# Patient Record
Sex: MALE | Race: WHITE | NOT HISPANIC OR LATINO | Employment: OTHER | URBAN - NONMETROPOLITAN AREA
[De-identification: names, ages, dates, MRNs, and addresses within clinical notes are randomized per-mention and may not be internally consistent; named-entity substitution may affect disease eponyms.]

---

## 2020-02-19 ENCOUNTER — OFFICE VISIT (OUTPATIENT)
Dept: CARDIOLOGY | Facility: CLINIC | Age: 65
End: 2020-02-19

## 2020-02-19 VITALS
BODY MASS INDEX: 27.01 KG/M2 | DIASTOLIC BLOOD PRESSURE: 82 MMHG | HEIGHT: 72 IN | WEIGHT: 199.4 LBS | HEART RATE: 71 BPM | SYSTOLIC BLOOD PRESSURE: 125 MMHG | OXYGEN SATURATION: 99 %

## 2020-02-19 DIAGNOSIS — I25.10 CORONARY ARTERY DISEASE DUE TO CALCIFIED CORONARY LESION: Primary | ICD-10-CM

## 2020-02-19 DIAGNOSIS — I10 ESSENTIAL HYPERTENSION: ICD-10-CM

## 2020-02-19 DIAGNOSIS — R00.2 PALPITATIONS: ICD-10-CM

## 2020-02-19 DIAGNOSIS — I25.84 CORONARY ARTERY DISEASE DUE TO CALCIFIED CORONARY LESION: Primary | ICD-10-CM

## 2020-02-19 PROCEDURE — 93000 ELECTROCARDIOGRAM COMPLETE: CPT | Performed by: PHYSICIAN ASSISTANT

## 2020-02-19 PROCEDURE — 99204 OFFICE O/P NEW MOD 45 MIN: CPT | Performed by: PHYSICIAN ASSISTANT

## 2020-02-19 RX ORDER — CLOPIDOGREL BISULFATE 75 MG/1
1 TABLET ORAL DAILY
COMMUNITY
Start: 2020-01-06 | End: 2022-02-02

## 2020-02-19 RX ORDER — ATORVASTATIN CALCIUM 40 MG/1
1 TABLET, FILM COATED ORAL DAILY
COMMUNITY
Start: 2020-01-06

## 2020-02-19 RX ORDER — ASCORBIC ACID 500 MG
500 TABLET ORAL DAILY
COMMUNITY
End: 2022-02-02

## 2020-02-19 RX ORDER — ASPIRIN 81 MG/1
81 TABLET ORAL DAILY
COMMUNITY

## 2020-02-19 RX ORDER — TRAMADOL HYDROCHLORIDE 50 MG/1
1 TABLET ORAL 4 TIMES DAILY PRN
COMMUNITY
Start: 2019-12-30

## 2020-02-19 RX ORDER — MELATONIN
1000 DAILY
COMMUNITY
End: 2022-02-02

## 2020-02-19 RX ORDER — LISINOPRIL 20 MG/1
1 TABLET ORAL DAILY
COMMUNITY
Start: 2020-01-06

## 2020-02-19 NOTE — PROGRESS NOTES
"Subjective   Amrit Brannon is a 64 y.o. male     Chief Complaint   Patient presents with   • Establish Care     Here to establish care      Problem list  1.  Coronary artery disease  1.1 cardiac catheterization approximately 5 to 10 years ago in [facility] with angioplasty x2 and stenting x1, inadequate data per patient report  2.  Hypertension  3.  Dyslipidemia  4.  Chronic tobacco use    HPI    Patient is a 64-year-old male that presents to the office to establish care.  Patient recently moved to the area in the last few months.  Patient has had documented history of coronary disease and history of stenting and angioplasty.    Patient feels remarkably well.  Patient does not describe any chest discomfort at all.  Patient has no shortness of breath.  No PND orthopnea.  Occasionally he will feel a \"fluttering\" sensation.  This occurs at random.  No dizziness presyncope or syncope.  Otherwise doing well    Patient describes in the past, patient had shoulder surgery.  After the surgery, he started developing arm discomfort.  Apparently he was diagnosed with a myocardial infarction which by description seems more related to an NSTEMI.  He had catheterization with results as above per his report.  He has done well since that time      Current Outpatient Medications   Medication Sig Dispense Refill   • aspirin 81 MG EC tablet Take 81 mg by mouth Daily.     • atorvastatin (LIPITOR) 40 MG tablet Take 1 tablet by mouth Daily.     • cholecalciferol (VITAMIN D3) 25 MCG (1000 UT) tablet Take 1,000 Units by mouth Daily.     • clopidogrel (PLAVIX) 75 MG tablet Take 1 tablet by mouth Daily.     • lisinopril (PRINIVIL,ZESTRIL) 20 MG tablet Take 1 tablet by mouth Daily.     • traMADol (ULTRAM) 50 MG tablet Take 1 tablet by mouth 4 (Four) Times a Day As Needed.     • vitamin C (ASCORBIC ACID) 500 MG tablet Take 500 mg by mouth Daily.       No current facility-administered medications for this visit.        Penicillins    Past " Medical History:   Diagnosis Date   • Coronary artery disease    • Hyperlipidemia    • Hypertension    • Myocardial infarction (CMS/HCC)        Social History     Socioeconomic History   • Marital status:      Spouse name: Not on file   • Number of children: Not on file   • Years of education: Not on file   • Highest education level: Not on file   Tobacco Use   • Smoking status: Current Every Day Smoker     Packs/day: 1.00     Years: 47.00     Pack years: 47.00     Types: Cigarettes   • Smokeless tobacco: Never Used   Substance and Sexual Activity   • Alcohol use: Yes     Frequency: 4 or more times a week     Drinks per session: 1 or 2   • Drug use: Never   • Sexual activity: Defer       Family History   Problem Relation Age of Onset   • Diabetes Mother    • Heart disease Mother    • No Known Problems Father        Review of Systems   Constitutional: Negative.  Negative for chills, fatigue and fever.   HENT: Negative.    Eyes: Positive for visual disturbance (reading glasses ).   Respiratory: Negative.  Negative for chest tightness and shortness of breath.    Cardiovascular: Positive for palpitations (rare flutters ). Negative for chest pain and leg swelling.   Gastrointestinal: Negative.  Negative for abdominal pain, blood in stool, nausea and vomiting.   Endocrine: Negative.  Negative for cold intolerance and heat intolerance.   Genitourinary: Negative.  Negative for dysuria, frequency, hematuria and urgency.   Musculoskeletal: Positive for arthralgias (joints ) and back pain (low back pain ).   Skin: Negative.  Negative for rash and wound.   Allergic/Immunologic: Negative.  Negative for environmental allergies and food allergies.   Neurological: Negative.  Negative for dizziness, weakness and light-headedness.   Hematological: Bruises/bleeds easily (bruises/bleeds easily ).   Psychiatric/Behavioral: Negative.  Negative for sleep disturbance (denies waking with smothering ).   All other systems reviewed  "and are negative.      Objective   Vitals:    02/19/20 0950   BP: 125/82   BP Location: Left arm   Patient Position: Sitting   Pulse: 71   SpO2: 99%   Weight: 90.4 kg (199 lb 6.4 oz)   Height: 182.9 cm (72\")      /82 (BP Location: Left arm, Patient Position: Sitting)   Pulse 71   Ht 182.9 cm (72\")   Wt 90.4 kg (199 lb 6.4 oz)   SpO2 99%   BMI 27.04 kg/m²     Lab Results (most recent)     None          Physical Exam   Constitutional: He is oriented to person, place, and time. He appears well-developed and well-nourished. No distress.   HENT:   Head: Normocephalic and atraumatic.   Eyes: Conjunctivae are normal. Right eye exhibits no discharge. Left eye exhibits no discharge. No scleral icterus.   Neck: No JVD present.   Cardiovascular: Normal rate, regular rhythm and normal heart sounds. Exam reveals no gallop and no friction rub.   No murmur heard.  Pulmonary/Chest: Effort normal and breath sounds normal. No respiratory distress. He has no wheezes. He has no rales. He exhibits no tenderness.   Musculoskeletal: Normal range of motion. He exhibits no edema.   Neurological: He is alert and oriented to person, place, and time. No cranial nerve deficit.   Skin: Skin is warm and dry. No rash noted. No erythema. No pallor.   Psychiatric: He has a normal mood and affect. His behavior is normal.   Nursing note and vitals reviewed.      Procedure     ECG 12 Lead  Date/Time: 2/19/2020 10:07 AM  Performed by: Axel Carvajal PA  Authorized by: Axel Carvajal PA   Previous ECG: no previous ECG available  QRS axis: right  Comments: EKG demonstrates sinus rhythm at 69 bpm with occasional PACs, no acute ST changes                 Assessment/Plan     Problems Addressed this Visit        Cardiovascular and Mediastinum    Coronary artery disease due to calcified coronary lesion - Primary    Relevant Medications    clopidogrel (PLAVIX) 75 MG tablet    Other Relevant Orders    ECG 12 Lead    Palpitations    Essential " hypertension    Relevant Medications    lisinopril (PRINIVIL,ZESTRIL) 20 MG tablet              Recommendation  1.  Patient with coronary artery disease doing remarkably well.  Patient has no ischemic symptoms.  For now, we will continue current medical regimen.  2.  Patient on proper medications including blood pressure management.  Patient on statin therapy lipids are being monitored by primary  3.  Patient feels remarkably well otherwise.  He has no symptoms to suggest LV dysfunction or ischemia.  For now we will continue to monitor and see him back for follow-up 6 months.  Follow-up with primary as scheduled         Amrit Brannon  reports that he has been smoking cigarettes. He has a 47.00 pack-year smoking history. He has never used smokeless tobacco.. I have educated him on the risk of diseases from using tobacco products such as cancer, COPD and heart diease.     I advised him to quit and he is not willing to quit.         Patient's Body mass index is 27.04 kg/m². BMI is above normal parameters. Recommendations include: educational material and referral to primary care.         Electronically signed by:

## 2020-08-19 ENCOUNTER — OFFICE VISIT (OUTPATIENT)
Dept: CARDIOLOGY | Facility: CLINIC | Age: 65
End: 2020-08-19

## 2020-08-19 VITALS
SYSTOLIC BLOOD PRESSURE: 132 MMHG | WEIGHT: 196 LBS | HEIGHT: 72 IN | TEMPERATURE: 97.5 F | DIASTOLIC BLOOD PRESSURE: 81 MMHG | HEART RATE: 74 BPM | OXYGEN SATURATION: 98 % | BODY MASS INDEX: 26.55 KG/M2

## 2020-08-19 DIAGNOSIS — I10 ESSENTIAL HYPERTENSION: ICD-10-CM

## 2020-08-19 DIAGNOSIS — E78.00 PURE HYPERCHOLESTEROLEMIA: ICD-10-CM

## 2020-08-19 DIAGNOSIS — I25.84 CORONARY ARTERY DISEASE DUE TO CALCIFIED CORONARY LESION: Primary | ICD-10-CM

## 2020-08-19 DIAGNOSIS — I25.10 CORONARY ARTERY DISEASE DUE TO CALCIFIED CORONARY LESION: Primary | ICD-10-CM

## 2020-08-19 PROCEDURE — 99213 OFFICE O/P EST LOW 20 MIN: CPT | Performed by: PHYSICIAN ASSISTANT

## 2020-08-19 NOTE — PROGRESS NOTES
Problem list     Subjective   Amrit Brannon is a 65 y.o. male     Chief Complaint   Patient presents with   • Follow-up     Problem list  1.  Coronary artery disease  1.1 cardiac catheterization approximately 5 to 10 years ago in [facility] with angioplasty x2 and stenting x1, inadequate data per patient report  2.  Hypertension  3.  Dyslipidemia  4.  Chronic tobacco use    HPI    Patient is a 65-year-old male that presents back to the office for routine follow-up.  Patient has history of coronary disease and we have been monitoring in regards to risk factor modification    Patient is doing well.  He does not complain of any chest pain.  He has shortness of breath if he was to exert for high levels but nothing that he feels has been progressive.  It is stable.  No PND orthopnea.    He does not palpitate or have dysrhythmic symptoms.  He does not have any other significant complaints at this time.  He feels remarkably well      Current Outpatient Medications on File Prior to Visit   Medication Sig Dispense Refill   • aspirin 81 MG EC tablet Take 81 mg by mouth Daily.     • atorvastatin (LIPITOR) 40 MG tablet Take 1 tablet by mouth Daily.     • cholecalciferol (VITAMIN D3) 25 MCG (1000 UT) tablet Take 1,000 Units by mouth Daily.     • clopidogrel (PLAVIX) 75 MG tablet Take 1 tablet by mouth Daily.     • lisinopril (PRINIVIL,ZESTRIL) 20 MG tablet Take 1 tablet by mouth Daily.     • traMADol (ULTRAM) 50 MG tablet Take 1 tablet by mouth 4 (Four) Times a Day As Needed.     • vitamin C (ASCORBIC ACID) 500 MG tablet Take 500 mg by mouth Daily.       No current facility-administered medications on file prior to visit.        Penicillins    Past Medical History:   Diagnosis Date   • Coronary artery disease    • Hyperlipidemia    • Hypertension    • Myocardial infarction (CMS/[facility])        Social History     Socioeconomic History   • Marital status:      Spouse name: Not on file   • Number of children: Not on file   "  • Years of education: Not on file   • Highest education level: Not on file   Tobacco Use   • Smoking status: Current Every Day Smoker     Packs/day: 1.00     Years: 47.00     Pack years: 47.00     Types: Cigarettes   • Smokeless tobacco: Never Used   Substance and Sexual Activity   • Alcohol use: Yes     Frequency: 4 or more times a week     Drinks per session: 1 or 2   • Drug use: Never   • Sexual activity: Defer       Family History   Problem Relation Age of Onset   • Diabetes Mother    • Heart disease Mother    • No Known Problems Father        Review of Systems   Constitutional: Negative for fatigue.   HENT: Negative for congestion, rhinorrhea and sore throat.    Eyes: Positive for visual disturbance (reading glasses).   Respiratory: Positive for shortness of breath (w/ increased activity). Negative for chest tightness.    Cardiovascular: Negative for chest pain (Denies CP), palpitations and leg swelling.   Gastrointestinal: Negative for abdominal pain, blood in stool, constipation, diarrhea, nausea and vomiting.   Endocrine: Negative for cold intolerance and heat intolerance.   Genitourinary: Negative for difficulty urinating and hematuria.   Musculoskeletal: Positive for arthralgias, back pain and neck pain.   Skin: Positive for rash (eczema ). Negative for wound.   Neurological: Negative for dizziness, syncope, weakness, light-headedness, numbness and headaches.   Hematological: Bruises/bleeds easily.   Psychiatric/Behavioral: Negative for sleep disturbance.   All other systems reviewed and are negative.      Objective   Vitals:    08/19/20 1022   BP: 132/81   Pulse: 74   Temp: 97.5 °F (36.4 °C)   SpO2: 98%   Weight: 88.9 kg (196 lb)   Height: 182.9 cm (72\")      /81   Pulse 74   Temp 97.5 °F (36.4 °C)   Ht 182.9 cm (72\")   Wt 88.9 kg (196 lb)   SpO2 98%   BMI 26.58 kg/m²     Lab Results (most recent)     None          Physical Exam   Constitutional: He is oriented to person, place, and time. He " appears well-developed and well-nourished. No distress.   HENT:   Head: Normocephalic and atraumatic.   Eyes: Conjunctivae are normal. Right eye exhibits no discharge. Left eye exhibits no discharge. No scleral icterus.   Neck: No tracheal deviation present.   Cardiovascular: Normal rate, regular rhythm and normal heart sounds. Exam reveals no gallop and no friction rub.   No murmur heard.  Pulmonary/Chest: Effort normal and breath sounds normal. No respiratory distress. He has no wheezes. He has no rales. He exhibits no tenderness.   Musculoskeletal: Normal range of motion. He exhibits no edema.   Neurological: He is alert and oriented to person, place, and time. No cranial nerve deficit.   Skin: Skin is warm and dry. No rash noted. No erythema. No pallor.   Psychiatric: He has a normal mood and affect. His behavior is normal.   Nursing note and vitals reviewed.      Procedure   Procedures       Assessment/Plan     Problems Addressed this Visit        Cardiovascular and Mediastinum    Coronary artery disease due to calcified coronary lesion - Primary    Essential hypertension    Pure hypercholesterolemia            Recommendation  1.  Patient with coronary disease doing remarkably well.  No ischemic symptoms at this time.  For now we will continue risk factor modification    2.  Patient with dyslipidemia on moderate dose statin.  Lipids are monitored by primary per patient report    3.  Blood pressures well controlled we will continue current regimen.  For now he is doing well.  We will see him back for follow-up in 1 year.  Follow-up with primary as scheduled         Amrit Clovis  reports that he has been smoking cigarettes. He has a 47.00 pack-year smoking history. He has never used smokeless tobacco.. I have educated him on the risk of diseases from using tobacco products such as cancer, COPD and heart diease.     I advised him to quit and he is not willing to quit.    Patient's Body mass index is 26.58 kg/m².  BMI is above normal parameters. Recommendations include: educational material.       Electronically signed by:

## 2020-08-19 NOTE — PATIENT INSTRUCTIONS
Steps to Quit Smoking  Smoking tobacco is the leading cause of preventable death. It can affect almost every organ in the body. Smoking puts you and people around you at risk for many serious, long-lasting (chronic) diseases. Quitting smoking can be hard, but it is one of the best things that you can do for your health. It is never too late to quit.  How do I get ready to quit?  When you decide to quit smoking, make a plan to help you succeed. Before you quit:  · Pick a date to quit. Set a date within the next 2 weeks to give you time to prepare.  · Write down the reasons why you are quitting. Keep this list in places where you will see it often.  · Tell your family, friends, and co-workers that you are quitting. Their support is important.  · Talk with your doctor about the choices that may help you quit.  · Find out if your health insurance will pay for these treatments.  · Know the people, places, things, and activities that make you want to smoke (triggers). Avoid them.  What first steps can I take to quit smoking?  · Throw away all cigarettes at home, at work, and in your car.  · Throw away the things that you use when you smoke, such as ashtrays and lighters.  · Clean your car. Make sure to empty the ashtray.  · Clean your home, including curtains and carpets.  What can I do to help me quit smoking?  Talk with your doctor about taking medicines and seeing a counselor at the same time. You are more likely to succeed when you do both.  · If you are pregnant or breastfeeding, talk with your doctor about counseling or other ways to quit smoking. Do not take medicine to help you quit smoking unless your doctor tells you to do so.  To quit smoking:  Quit right away  · Quit smoking totally, instead of slowly cutting back on how much you smoke over a period of time.  · Go to counseling. You are more likely to quit if you go to counseling sessions regularly.  Take medicine  You may take medicines to help you quit. Some  medicines need a prescription, and some you can buy over-the-counter. Some medicines may contain a drug called nicotine to replace the nicotine in cigarettes. Medicines may:  · Help you to stop having the desire to smoke (cravings).  · Help to stop the problems that come when you stop smoking (withdrawal symptoms).  Your doctor may ask you to use:  · Nicotine patches, gum, or lozenges.  · Nicotine inhalers or sprays.  · Non-nicotine medicine that is taken by mouth.  Find resources  Find resources and other ways to help you quit smoking and remain smoke-free after you quit. These resources are most helpful when you use them often. They include:  · Online chats with a counselor.  · Phone quitlines.  · Printed self-help materials.  · Support groups or group counseling.  · Text messaging programs.  · Mobile phone apps. Use apps on your mobile phone or tablet that can help you stick to your quit plan. There are many free apps for mobile phones and tablets as well as websites. Examples include Quit Guide from the CDC and smokefree.gov    What things can I do to make it easier to quit?    · Talk to your family and friends. Ask them to support and encourage you.  · Call a phone quitline (1-390-QUITNOW), reach out to support groups, or work with a counselor.  · Ask people who smoke to not smoke around you.  · Avoid places that make you want to smoke, such as:  ? Bars.  ? Parties.  ? Smoke-break areas at work.  · Spend time with people who do not smoke.  · Lower the stress in your life. Stress can make you want to smoke. Try these things to help your stress:  ? Getting regular exercise.  ? Doing deep-breathing exercises.  ? Doing yoga.  ? Meditating.  ? Doing a body scan. To do this, close your eyes, focus on one area of your body at a time from head to toe. Notice which parts of your body are tense. Try to relax the muscles in those areas.  How will I feel when I quit smoking?  Day 1 to 3 weeks  Within the first 24 hours,  you may start to have some problems that come from quitting tobacco. These problems are very bad 2-3 days after you quit, but they do not often last for more than 2-3 weeks. You may get these symptoms:  · Mood swings.  · Feeling restless, nervous, angry, or annoyed.  · Trouble concentrating.  · Dizziness.  · Strong desire for high-sugar foods and nicotine.  · Weight gain.  · Trouble pooping (constipation).  · Feeling like you may vomit (nausea).  · Coughing or a sore throat.  · Changes in how the medicines that you take for other issues work in your body.  · Depression.  · Trouble sleeping (insomnia).  Week 3 and afterward  After the first 2-3 weeks of quitting, you may start to notice more positive results, such as:  · Better sense of smell and taste.  · Less coughing and sore throat.  · Slower heart rate.  · Lower blood pressure.  · Clearer skin.  · Better breathing.  · Fewer sick days.  Quitting smoking can be hard. Do not give up if you fail the first time. Some people need to try a few times before they succeed. Do your best to stick to your quit plan, and talk with your doctor if you have any questions or concerns.  Summary  · Smoking tobacco is the leading cause of preventable death. Quitting smoking can be hard, but it is one of the best things that you can do for your health.  · When you decide to quit smoking, make a plan to help you succeed.  · Quit smoking right away, not slowly over a period of time.  · When you start quitting, seek help from your doctor, family, or friends.  This information is not intended to replace advice given to you by your health care provider. Make sure you discuss any questions you have with your health care provider.  Document Released: 10/14/2010 Document Revised: 03/06/2020 Document Reviewed: 03/07/2020  [employer] Patient Education © 2020 [employer]  How to Quarantine at Home  Information for Patients and Families    These instructions are for people with confirmed or  suspected COVID-19 who do not need to be hospitalized and those with confirmed COVID-19 who were hospitalized and discharged to care for themselves at home.    If you were tested through the Health Department  The Health Department will monitor your wellbeing.  If it is determined that you do not need to be hospitalized and can be isolated at home, you will be monitored by staff from your local or state health department.     If you were tested through a [facility]  You will need to monitor yourself and report changes in your symptoms to your doctor.  See the section below called Monitor Your Symptoms.    Follow these steps until a healthcare provider or local or state health department says you can return to your normal activities.    Stay home except to get medical care  • Restrict activities outside your home, except for getting medical care.   • Do not go to work, school, or public areas.   • Avoid using public transportation, ride-sharing, or taxis.    Separate yourself from other people and animals in your home  People  As much as possible, you should stay in a specific room and away from other people in your home. Also, you should use a separate bathroom, if available.    Animals  You should restrict contact with pets and other animals while you are sick with COVID-19, just like you would around other people. When possible, have another member of your household care for your animals while you are sick. If you are sick with COVID-19, avoid contact with your pet, including petting, snuggling, being kissed or licked, and sharing food. If you must care for your pet or be around animals while you are sick, wash your hands before and after you interact with pets and wear a facemask. See COVID-19 and Animals for more information.    Call ahead before visiting your doctor  If you have a medical appointment, call the healthcare provider and tell them that you have or may have COVID-19. This information will help  the healthcare provider’s office take steps to keep other people from getting infected or exposed.    Wear a facemask  You should wear a facemask when you are around other people (e.g., sharing a room or vehicle) or pets and before you enter a healthcare provider’s office.     If you are not able to wear a facemask (for example, because it causes trouble breathing), then people who live with you should not stay in the same room with you, or they should wear a facemask if they enter your room.    Cover your coughs and sneezes  • Cover your mouth and nose with a tissue when you cough or sneeze.   • Throw used tissues in a lined trash can.   • Immediately wash your hands with soap and water for at least 20 seconds or, if soap and water are not available, clean your hands with an alcohol-based hand  that contains at least 60% alcohol.    Clean your hands often  • Wash your hands often with soap and water for at least 20 seconds, especially after blowing your nose, coughing, or sneezing; going to the bathroom; and before eating or preparing food.     • If soap and water are not readily available, use an alcohol-based hand  with at least 60% alcohol, covering all surfaces of your hands and rubbing them together until they feel dry.    • Soap and water are the best option if hands are visibly dirty. Avoid touching your eyes, nose, and mouth with unwashed hands.    Avoid sharing personal household items  • You should not share dishes, drinking glasses, cups, eating utensils, towels, or bedding with other people or pets in your home.   • After using these items, they should be washed thoroughly with soap and water.    Clean all “high-touch” surfaces everyday  • High touch surfaces include counters, tabletops, doorknobs, bathroom fixtures, toilets, phones, keyboards, tablets, and bedside tables.   • Also, clean any surfaces that may have blood, stool, or body fluids on them.   • Use a household cleaning  spray or wipe, according to the label instructions. Labels contain instructions for safe and effective use of the cleaning product, including precautions you should take when applying the product, such as wearing gloves and making sure you have good ventilation during use of the product.    Monitor your symptoms  • Seek prompt medical attention if your illness is worsening (e.g., difficulty breathing).   • Before seeking care, call your healthcare provider and tell them that you have, or are being evaluated for, COVID-19.   • Put on a facemask before you enter the facility.     • These steps will help the healthcare provider’s office to keep other people in the office or waiting room from getting infected or exposed.   • Persons who are placed under active monitoring or facilitated self-monitoring should follow instructions provided by their local health department or occupational health professionals, as appropriate.  • If you have a medical emergency and need to call 911, notify the dispatch personnel that you have, or are being evaluated for COVID-19. If possible, put on a facemask before emergency medical services arrive.    Discontinuing home isolation  Patients with confirmed COVID-19 should remain under home isolation precautions until the risk of secondary transmission to others is thought to be low. The decision to discontinue home isolation precautions should be made on a case-by-case basis, in consultation with healthcare providers and state and local health departments.    The below content are for household members, intimate partners, and caregivers of a patient with symptomatic laboratory-confirmed COVID-19 or a patient under investigation:    Household members, intimate partners, and caregivers may have close contact with a person with symptomatic, laboratory-confirmed COVID-19 or a person under investigation.     Close contacts should monitor their health; they should call their healthcare provider  right away if they develop symptoms suggestive of COVID-19 (e.g., fever, cough, shortness of breath)     Close contacts should also follow these recommendations:  • Make sure that you understand and can help the patient follow their healthcare provider’s instructions for medication(s) and care. You should help the patient with basic needs in the home and provide support for getting groceries, prescriptions, and other personal needs.  • Monitor the patient’s symptoms. If the patient is getting sicker, call his or her healthcare provider and tell them that the patient has laboratory-confirmed COVID-19. This will help the healthcare provider’s office take steps to keep other people in the office or waiting room from getting infected. Ask the healthcare provider to call the local or state health department for additional guidance. If the patient has a medical emergency and you need to call 911, notify the dispatch personnel that the patient has, or is being evaluated for COVID-19.  • Household members should stay in another room or be  from the patient as much as possible. Household members should use a separate bedroom and bathroom, if available.  • Prohibit visitors who do not have an essential need to be in the home.  • Household members should care for any pets in the home. Do not handle pets or other animals while sick.  For more information, see COVID-19 and Animals.  • Make sure that shared spaces in the home have good air flow, such as by an air conditioner or an opened window, weather permitting.  • Perform hand hygiene frequently. Wash your hands often with soap and water for at least 20 seconds or use an alcohol-based hand  that contains 60 to 95% alcohol, covering all surfaces of your hands and rubbing them together until they feel dry. Soap and water should be used preferentially if hands are visibly dirty.  • Avoid touching your eyes, nose, and mouth with unwashed hands.  • The patient  should wear a facemask when you are around other people. If the patient is not able to wear a facemask (for example, because it causes trouble breathing), you, as the caregiver, should wear a mask when you are in the same room as the patient.  • Wear a disposable facemask and gloves when you touch or have contact with the patient’s blood, stool, or body fluids, such as saliva, sputum, nasal mucus, vomit, or urine.   o Throw out disposable facemasks and gloves after using them. Do not reuse.  o When removing personal protective equipment, first remove and dispose of gloves. Then, immediately clean your hands with soap and water or alcohol-based hand . Next, remove and dispose of facemask, and immediately clean your hands again with soap and water or alcohol-based hand .  • Avoid sharing household items with the patient. You should not share dishes, drinking glasses, cups, eating utensils, towels, bedding, or other items. After the patient uses these items, you should wash them thoroughly (see below “Wash laundry thoroughly”).  • Clean all “high-touch” surfaces, such as counters, tabletops, doorknobs, bathroom fixtures, toilets, phones, keyboards, tablets, and bedside tables, every day. Also, clean any surfaces that may have blood, stool, or body fluids on them.   o Use a household cleaning spray or wipe, according to the label instructions. Labels contain instructions for safe and effective use of the cleaning product including precautions you should take when applying the product, such as wearing gloves and making sure you have good ventilation during use of the product.  • Wash laundry thoroughly.   o Immediately remove and wash clothes or bedding that have blood, stool, or body fluids on them.  o Wear disposable gloves while handling soiled items and keep soiled items away from your body. Clean your hands (with soap and water or an alcohol-based hand ) immediately after removing your  gloves.  o Read and follow directions on labels of laundry or clothing items and detergent. In general, using a normal laundry detergent according to washing machine instructions and dry thoroughly using the warmest temperatures recommended on the clothing label.  • Place all used disposable gloves, facemasks, and other contaminated items in a lined container before disposing of them with other household waste. Clean your hands (with soap and water or an alcohol-based hand ) immediately after handling these items. Soap and water should be used preferentially if hands are visibly dirty.  • Discuss any additional questions with your state or local health department or healthcare provider.    Adapted from information provided by the Centers for Disease Control and Prevention.  For more information, visit https://www.cdc.gov/coronavirus/2019-ncov/hcp/guidance-prevent-spread.htmlFat and Cholesterol Restricted Eating Plan  Getting too much fat and cholesterol in your diet may cause health problems. Choosing the right foods helps keep your fat and cholesterol at normal levels. This can keep you from getting certain diseases.  Your doctor may recommend an eating plan that includes:  · Total fat: ______% or less of total calories a day.  · Saturated fat: ______% or less of total calories a day.  · Cholesterol: less than _________mg a day.  · Fiber: ______g a day.  What are tips for following this plan?  Meal planning  · At meals, divide your plate into four equal parts:  ? Fill one-half of your plate with vegetables and green salads.  ? Fill one-fourth of your plate with whole grains.  ? Fill one-fourth of your plate with low-fat (lean) protein foods.  · Eat fish that is high in omega-3 fats at least two times a week. This includes mackerel, tuna, sardines, and salmon.  · Eat foods that are high in fiber, such as whole grains, beans, apples, broccoli, carrots, peas, and barley.  General tips    · Work with your  "doctor to lose weight if you need to.  · Avoid:  ? Foods with added sugar.  ? Fried foods.  ? Foods with partially hydrogenated oils.  · Limit alcohol intake to no more than 1 drink a day for nonpregnant women and 2 drinks a day for men. One drink equals 12 oz of beer, 5 oz of wine, or 1½ oz of hard liquor.  Reading food labels  · Check food labels for:  ? Trans fats.  ? Partially hydrogenated oils.  ? Saturated fat (g) in each serving.  ? Cholesterol (mg) in each serving.  ? Fiber (g) in each serving.  · Choose foods with healthy fats, such as:  ? Monounsaturated fats.  ? Polyunsaturated fats.  ? Omega-3 fats.  · Choose grain products that have whole grains. Look for the word \"whole\" as the first word in the ingredient list.  Cooking  · Cook foods using low-fat methods. These include baking, boiling, grilling, and broiling.  · Eat more home-cooked foods. Eat at restaurants and buffets less often.  · Avoid cooking using saturated fats, such as butter, cream, palm oil, palm kernel oil, and coconut oil.  Recommended foods    Fruits  · All fresh, canned (in natural juice), or frozen fruits.  Vegetables  · Fresh or frozen vegetables (raw, steamed, roasted, or grilled). Green salads.  Grains  · Whole grains, such as whole wheat or whole grain breads, crackers, cereals, and pasta. Unsweetened oatmeal, bulgur, barley, quinoa, or brown rice. Corn or whole wheat flour tortillas.  Meats and other protein foods  · Ground beef (85% or leaner), grass-fed beef, or beef trimmed of fat. Skinless chicken or turkey. Ground chicken or turkey. Pork trimmed of fat. All fish and seafood. Egg whites. Dried beans, peas, or lentils. Unsalted nuts or seeds. Unsalted canned beans. Nut butters without added sugar or oil.  Dairy  · Low-fat or nonfat dairy products, such as skim or 1% milk, 2% or reduced-fat cheeses, low-fat and fat-free ricotta or cottage cheese, or plain low-fat and nonfat yogurt.  Fats and oils  · Tub margarine without trans " fats. Light or reduced-fat mayonnaise and salad dressings. Avocado. Olive, canola, sesame, or safflower oils.  The items listed above may not be a complete list of foods and beverages you can eat. Contact a dietitian for more information.  Foods to avoid  Fruits  · Canned fruit in heavy syrup. Fruit in cream or butter sauce. Fried fruit.  Vegetables  · Vegetables cooked in cheese, cream, or butter sauce. Fried vegetables.  Grains  · White bread. White pasta. White rice. Cornbread. Bagels, pastries, and croissants. Crackers and snack foods that contain trans fat and hydrogenated oils.  Meats and other protein foods  · Fatty cuts of meat. Ribs, chicken wings, read, sausage, bologna, salami, chitterlings, fatback, hot dogs, bratwurst, and packaged lunch meats. Liver and organ meats. Whole eggs and egg yolks. Chicken and turkey with skin. Fried meat.  Dairy  · Whole or 2% milk, cream, half-and-half, and cream cheese. Whole milk cheeses. Whole-fat or sweetened yogurt. Full-fat cheeses. Nondairy creamers and whipped toppings. Processed cheese, cheese spreads, and cheese curds.  Beverages  · Alcohol. Sugar-sweetened drinks such as sodas, lemonade, and fruit drinks.  Fats and oils  · Butter, stick margarine, lard, shortening, ghee, or read fat. Coconut, palm kernel, and palm oils.  Sweets and desserts  · Corn syrup, sugars, honey, and molasses. Candy. Jam and jelly. Syrup. Sweetened cereals. Cookies, pies, cakes, donuts, muffins, and ice cream.  The items listed above may not be a complete list of foods and beverages you should avoid. Contact a dietitian for more information.  Summary  · Choosing the right foods helps keep your fat and cholesterol at normal levels. This can keep you from getting certain diseases.  · At meals, fill one-half of your plate with vegetables and green salads.  · Eat high-fiber foods, like whole grains, beans, apples, carrots, peas, and barley.  · Limit added sugar, saturated fats, alcohol, and  "fried foods.  This information is not intended to replace advice given to you by your health care provider. Make sure you discuss any questions you have with your health care provider.  Document Released: 06/18/2013 Document Revised: 08/21/2019 Document Reviewed: 09/04/2018  [employer] Patient Education © 2020 [employer]  BMI for Adults    Body mass index (BMI) is a number that is calculated from a person's weight and height. BMI may help to estimate how much of a person's weight is composed of fat. BMI can help identify those who may be at higher risk for certain medical problems.  How is BMI used with adults?  BMI is used as a screening tool to identify possible weight problems. It is used to check whether a person is obese, overweight, healthy weight, or underweight.  How is BMI calculated?  BMI measures your weight and compares it to your height. This can be done either in English ([place] or metric measurements. Note that charts are available to help you find your BMI quickly and easily without having to do these calculations yourself.  To calculate your BMI in English ([place] measurements, your health care provider will:  1. Measure your weight in pounds (lb).  2. Multiply the number of pounds by 703.  ? For example, for a person who weighs 180 lb, multiply that number by 703, which equals 126,540.  3. Measure your height in inches (in). Then multiply that number by itself to get a measurement called \"inches squared.\"  ? For example, for a person who is 70 in tall, the \"inches squared\" measurement is 70 in x 70 in, which equals 4900 inches squared.  4. Divide the total from Step 2 (number of lb x 703) by the total from Step 3 (inches squared): 126,540 ÷ 4900 = 25.8. This is your BMI.  To calculate your BMI in metric measurements, your health care provider will:  1. Measure your weight in kilograms (kg).  2. Measure your height in meters (m). Then multiply that number by itself to get a measurement called \"meters " "squared.\"  ? For example, for a person who is 1.75 m tall, the \"meters squared\" measurement is 1.75 m x 1.75 m, which is equal to 3.1 meters squared.  3. Divide the number of kilograms (your weight) by the meters squared number. In this example: 70 ÷ 3.1 = 22.6. This is your BMI.  How is BMI interpreted?  To interpret your results, your health care provider will use BMI charts to identify whether you are underweight, normal weight, overweight, or obese. The following guidelines will be used:  · Underweight: BMI less than 18.5.  · Normal weight: BMI between 18.5 and 24.9.  · Overweight: BMI between 25 and 29.9.  · Obese: BMI of 30 and above.  Please note:  · Weight includes both fat and muscle, so someone with a muscular build, such as an athlete, may have a BMI that is higher than 24.9. In cases like these, BMI is not an accurate measure of body fat.  · To determine if excess body fat is the cause of a BMI of 25 or higher, further assessments may need to be done by a health care provider.  · BMI is usually interpreted in the same way for men and women.  Why is BMI a useful tool?  BMI is useful in two ways:  · Identifying a weight problem that may be related to a medical condition, or that may increase the risk for medical problems.  · Promoting lifestyle and diet changes in order to reach a healthy weight.  Summary  · Body mass index (BMI) is a number that is calculated from a person's weight and height.  · BMI may help to estimate how much of a person's weight is composed of fat. BMI can help identify those who may be at higher risk for certain medical problems.  · BMI can be measured using English measurements or metric measurements.  · To interpret your results, your health care provider will use BMI charts to identify whether you are underweight, normal weight, overweight, or obese.  This information is not intended to replace advice given to you by your health care provider. Make sure you discuss any questions " you have with your health care provider.  Document Released: 08/29/2005 Document Revised: 11/30/2018 Document Reviewed: 10/31/2018  Elsevier Patient Education © 2020 [employer]

## 2021-08-02 ENCOUNTER — OFFICE VISIT (OUTPATIENT)
Dept: CARDIOLOGY | Facility: CLINIC | Age: 66
End: 2021-08-02

## 2021-08-02 VITALS
WEIGHT: 201.2 LBS | BODY MASS INDEX: 27.25 KG/M2 | DIASTOLIC BLOOD PRESSURE: 78 MMHG | HEIGHT: 72 IN | OXYGEN SATURATION: 97 % | SYSTOLIC BLOOD PRESSURE: 131 MMHG | HEART RATE: 65 BPM

## 2021-08-02 DIAGNOSIS — I25.10 CORONARY ARTERY DISEASE INVOLVING NATIVE CORONARY ARTERY OF NATIVE HEART WITHOUT ANGINA PECTORIS: Primary | ICD-10-CM

## 2021-08-02 DIAGNOSIS — I10 ESSENTIAL HYPERTENSION: ICD-10-CM

## 2021-08-02 DIAGNOSIS — E78.00 PURE HYPERCHOLESTEROLEMIA: ICD-10-CM

## 2021-08-02 PROCEDURE — 99213 OFFICE O/P EST LOW 20 MIN: CPT | Performed by: PHYSICIAN ASSISTANT

## 2021-08-02 PROCEDURE — 93000 ELECTROCARDIOGRAM COMPLETE: CPT | Performed by: PHYSICIAN ASSISTANT

## 2021-08-02 RX ORDER — CETIRIZINE HYDROCHLORIDE 10 MG/1
10 TABLET ORAL DAILY
COMMUNITY

## 2021-08-02 RX ORDER — MELOXICAM 7.5 MG/1
7.5 TABLET ORAL DAILY
COMMUNITY

## 2021-08-02 NOTE — PROGRESS NOTES
Problem list     Subjective   Amrit Brannon is a 66 y.o. male     Chief Complaint   Patient presents with   • Follow-up   • Coronary Artery Disease   Problem list  1.  Coronary artery disease  1.1 cardiac catheterization approximately 5 to 10 years ago in [facility] with angioplasty x2 and stenting x1, inadequate data per patient report  2.  Hypertension  3.  Dyslipidemia  4.  Chronic tobacco use    HPI    Patient is a 66-year-old male who presents back to the office for evaluation.  Patient does not complain of any chest pain or pressure.  Patient has had mild stable dyspnea when exerting but no progressive shortness of breath.  No complaints of PND orthopnea.    Patient does not complain of palpitations or dysrhythmic symptoms.  Otherwise appears stable            Patient brought in medicine list to appointment, it's been reviewed with patient and med list was updated in the chart.         Current Outpatient Medications on File Prior to Visit   Medication Sig Dispense Refill   • aspirin 81 MG EC tablet Take 81 mg by mouth Daily.     • atorvastatin (LIPITOR) 40 MG tablet Take 1 tablet by mouth Daily.     • cetirizine (zyrTEC) 10 MG tablet Take 10 mg by mouth Daily.     • clopidogrel (PLAVIX) 75 MG tablet Take 1 tablet by mouth Daily.     • lisinopril (PRINIVIL,ZESTRIL) 20 MG tablet Take 1 tablet by mouth Daily.     • meloxicam (MOBIC) 7.5 MG tablet Take 7.5 mg by mouth Daily.     • traMADol (ULTRAM) 50 MG tablet Take 1 tablet by mouth 4 (Four) Times a Day As Needed.     • cholecalciferol (VITAMIN D3) 25 MCG (1000 UT) tablet Take 1,000 Units by mouth Daily.     • vitamin C (ASCORBIC ACID) 500 MG tablet Take 500 mg by mouth Daily.       No current facility-administered medications on file prior to visit.       Penicillins    Past Medical History:   Diagnosis Date   • Coronary artery disease    • Hyperlipidemia    • Hypertension    • Myocardial infarction (CMS/[facility])        Social History     Socioeconomic History  "  • Marital status:      Spouse name: Not on file   • Number of children: Not on file   • Years of education: Not on file   • Highest education level: Not on file   Tobacco Use   • Smoking status: Current Every Day Smoker     Packs/day: 1.00     Years: 47.00     Pack years: 47.00     Types: Cigarettes   • Smokeless tobacco: Never Used   Substance and Sexual Activity   • Alcohol use: Yes   • Drug use: Never   • Sexual activity: Defer       Family History   Problem Relation Age of Onset   • Diabetes Mother    • Heart disease Mother    • No Known Problems Father        Review of Systems   Respiratory: Negative.  Negative for apnea, chest tightness and shortness of breath.    Cardiovascular: Negative for chest pain, palpitations and leg swelling.   Musculoskeletal: Positive for arthralgias and back pain. Negative for gait problem, neck pain and neck stiffness.   Skin: Negative.  Negative for rash and wound.   Allergic/Immunologic: Positive for environmental allergies. Negative for food allergies.   Neurological: Negative.  Negative for dizziness, weakness, light-headedness, numbness and headaches.   Hematological: Bruises/bleeds easily.   Psychiatric/Behavioral: Negative.  Negative for sleep disturbance.       Objective   Vitals:    08/02/21 1040   BP: 131/78   BP Location: Left arm   Patient Position: Sitting   Pulse: 65   SpO2: 97%   Weight: 91.3 kg (201 lb 3.2 oz)   Height: 182.9 cm (72\")      /78 (BP Location: Left arm, Patient Position: Sitting)   Pulse 65   Ht 182.9 cm (72\")   Wt 91.3 kg (201 lb 3.2 oz)   SpO2 97%   BMI 27.29 kg/m²     Lab Results (most recent)     None          Physical Exam  Vitals and nursing note reviewed.   Constitutional:       General: He is not in acute distress.     Appearance: Normal appearance. He is well-developed.   HENT:      Head: Normocephalic and atraumatic.   Eyes:      General: No scleral icterus.        Right eye: No discharge.         Left eye: No discharge. "      Conjunctiva/sclera: Conjunctivae normal.   Neck:      Vascular: No carotid bruit.   Cardiovascular:      Rate and Rhythm: Normal rate and regular rhythm.      Heart sounds: Normal heart sounds. No murmur heard.   No friction rub. No gallop.    Pulmonary:      Effort: Pulmonary effort is normal. No respiratory distress.      Breath sounds: Normal breath sounds. No wheezing or rales.   Chest:      Chest wall: No tenderness.   Musculoskeletal:      Right lower leg: No edema.      Left lower leg: No edema.   Skin:     General: Skin is warm and dry.      Coloration: Skin is not pale.      Findings: No erythema or rash.   Neurological:      Mental Status: He is alert and oriented to person, place, and time.      Cranial Nerves: No cranial nerve deficit.   Psychiatric:         Behavior: Behavior normal.         Procedure     ECG 12 Lead    Date/Time: 8/2/2021 10:47 AM  Performed by: Axel Carvajal PA  Authorized by: Axel Carvajal PA   Comparison: compared with previous ECG from 2/19/2020  Comments: EKG demonstrates sinus arrhythmia at 64 bpm with PACs noted and no acute ST changes               Assessment/Plan     Problems Addressed this Visit        Cardiac and Vasculature    Essential hypertension    Pure hypercholesterolemia      Other Visit Diagnoses     Coronary artery disease involving native coronary artery of native heart without angina pectoris    -  Primary      Diagnoses       Codes Comments    Coronary artery disease involving native coronary artery of native heart without angina pectoris    -  Primary ICD-10-CM: I25.10  ICD-9-CM: 414.01     Essential hypertension     ICD-10-CM: I10  ICD-9-CM: 401.9     Pure hypercholesterolemia     ICD-10-CM: E78.00  ICD-9-CM: 272.0             Recommendation  1.  Patient is a 66-year-old male who presents to the office for evaluation..  He has history of coronary disease but is doing well with no anginal symptoms.  For now we will continue medical therapy risk  factor modification.    2.  Blood pressure currently controlled on current medical regimen today.  We will continue for now.    3.  Patient with dyslipidemia currently on Lipitor.  By review of lipid parameters last year, LDL was 94.  I discussed increasing Lipitor but patient apparently is going to have blood work drawn this year.  We can make adjustments pending those findings    4.  For now we will see patient back for follow-up in 6 months or sooner as symptoms discussed.  Follow-up with primary as scheduled           Electronically signed by:

## 2022-02-02 ENCOUNTER — OFFICE VISIT (OUTPATIENT)
Dept: CARDIOLOGY | Facility: CLINIC | Age: 67
End: 2022-02-02

## 2022-02-02 VITALS
OXYGEN SATURATION: 99 % | WEIGHT: 204 LBS | HEART RATE: 78 BPM | HEIGHT: 72 IN | DIASTOLIC BLOOD PRESSURE: 66 MMHG | BODY MASS INDEX: 27.63 KG/M2 | SYSTOLIC BLOOD PRESSURE: 126 MMHG

## 2022-02-02 DIAGNOSIS — I25.10 CORONARY ARTERY DISEASE INVOLVING NATIVE CORONARY ARTERY OF NATIVE HEART WITHOUT ANGINA PECTORIS: Primary | ICD-10-CM

## 2022-02-02 DIAGNOSIS — I10 ESSENTIAL HYPERTENSION: ICD-10-CM

## 2022-02-02 DIAGNOSIS — E78.00 PURE HYPERCHOLESTEROLEMIA: ICD-10-CM

## 2022-02-02 PROCEDURE — 99213 OFFICE O/P EST LOW 20 MIN: CPT | Performed by: PHYSICIAN ASSISTANT

## 2022-02-02 NOTE — PROGRESS NOTES
Problem list     Subjective   Amrit Brannon is a 66 y.o. male     Chief Complaint   Patient presents with   • Coronary Artery Disease     Problem list  1.  Coronary artery disease  1.1 cardiac catheterization approximately 5 to 10 years ago in [facility] with angioplasty x2 and stenting x1, inadequate data per patient report  2.  Hypertension  3.  Dyslipidemia  4.  Chronic tobacco use     HPI    Patient is a 66-year-old male who presents back to the office for routine follow-up.  He has felt remarkably well since his last visit.  No complaints of chest pain or pressure.  No shortness of breath.  No PND orthopnea.    He does not describe any palpitations or dysrhythmic symptoms and is feeling remarkably well at this point        Current Outpatient Medications on File Prior to Visit   Medication Sig Dispense Refill   • aspirin 81 MG EC tablet Take 81 mg by mouth Daily.     • atorvastatin (LIPITOR) 40 MG tablet Take 1 tablet by mouth Daily.     • cetirizine (zyrTEC) 10 MG tablet Take 10 mg by mouth Daily.     • lisinopril (PRINIVIL,ZESTRIL) 20 MG tablet Take 1 tablet by mouth Daily.     • meloxicam (MOBIC) 7.5 MG tablet Take 7.5 mg by mouth Daily.     • traMADol (ULTRAM) 50 MG tablet Take 1 tablet by mouth 4 (Four) Times a Day As Needed.     • [DISCONTINUED] clopidogrel (PLAVIX) 75 MG tablet Take 1 tablet by mouth Daily.     • [DISCONTINUED] cholecalciferol (VITAMIN D3) 25 MCG (1000 UT) tablet Take 1,000 Units by mouth Daily.     • [DISCONTINUED] vitamin C (ASCORBIC ACID) 500 MG tablet Take 500 mg by mouth Daily.       No current facility-administered medications on file prior to visit.       Penicillins    Past Medical History:   Diagnosis Date   • Coronary artery disease    • Hyperlipidemia    • Hypertension    • Myocardial infarction (HCC)        Social History     Socioeconomic History   • Marital status:    Tobacco Use   • Smoking status: Current Every Day Smoker     Packs/day: 1.00     Years: 47.00  "    Pack years: 47.00     Types: Cigarettes   • Smokeless tobacco: Never Used   Substance and Sexual Activity   • Alcohol use: Yes     Comment: beer   • Drug use: Never   • Sexual activity: Defer       Family History   Problem Relation Age of Onset   • Diabetes Mother    • Heart disease Mother    • No Known Problems Father        Review of Systems   Constitutional: Negative.  Negative for chills and fever.   HENT: Negative.  Negative for congestion and sinus pressure.    Respiratory: Negative for chest tightness and shortness of breath (at times).    Cardiovascular: Negative.  Negative for chest pain, palpitations and leg swelling.   Gastrointestinal: Negative for abdominal pain, blood in stool, constipation, diarrhea, nausea and vomiting.   Endocrine: Negative.  Negative for cold intolerance and heat intolerance.   Genitourinary: Negative.  Negative for dysuria, frequency, hematuria and urgency.   Neurological: Negative.  Negative for dizziness, syncope and light-headedness.   Psychiatric/Behavioral: Negative.  Negative for sleep disturbance (denies waking with soa or cp).       Objective   Vitals:    02/02/22 0952   BP: 126/66   BP Location: Left arm   Patient Position: Sitting   Pulse: 78   SpO2: 99%   Weight: 92.5 kg (204 lb)   Height: 182.9 cm (72\")      /66 (BP Location: Left arm, Patient Position: Sitting)   Pulse 78   Ht 182.9 cm (72\")   Wt 92.5 kg (204 lb)   SpO2 99%   BMI 27.67 kg/m²     Lab Results (most recent)     None          Physical Exam  Vitals and nursing note reviewed.   Constitutional:       General: He is not in acute distress.     Appearance: Normal appearance. He is well-developed.   HENT:      Head: Normocephalic and atraumatic.   Eyes:      General: No scleral icterus.        Right eye: No discharge.         Left eye: No discharge.      Conjunctiva/sclera: Conjunctivae normal.   Neck:      Vascular: No carotid bruit.   Cardiovascular:      Rate and Rhythm: Normal rate and regular " rhythm.      Heart sounds: Normal heart sounds. No murmur heard.  No friction rub. No gallop.    Pulmonary:      Effort: Pulmonary effort is normal. No respiratory distress.      Breath sounds: Normal breath sounds. No wheezing or rales.   Chest:      Chest wall: No tenderness.   Musculoskeletal:      Right lower leg: No edema.      Left lower leg: No edema.   Skin:     General: Skin is warm and dry.      Coloration: Skin is not pale.      Findings: No erythema or rash.   Neurological:      Mental Status: He is alert and oriented to person, place, and time.      Cranial Nerves: No cranial nerve deficit.   Psychiatric:         Behavior: Behavior normal.         Procedure   Procedures       Assessment/Plan     Problems Addressed this Visit        Cardiac and Vasculature    Coronary artery disease involving native coronary artery of native heart without angina pectoris - Primary    Essential hypertension    Pure hypercholesterolemia      Diagnoses       Codes Comments    Coronary artery disease involving native coronary artery of native heart without angina pectoris    -  Primary ICD-10-CM: I25.10  ICD-9-CM: 414.01     Essential hypertension     ICD-10-CM: I10  ICD-9-CM: 401.9     Pure hypercholesterolemia     ICD-10-CM: E78.00  ICD-9-CM: 272.0             Recommendation  1.  Patient is a 66-year-old male who presents to the office for evaluation with history of coronary disease and stenting that is feeling remarkably well without any ischemic or failure symptoms.  For now continue risk factor modification    2.  Patient on aspirin and moderate to high-dose statin with Lipitor 40 mg.  We will continue statin therapy and patient to have lab work performed by PCP.    3.  Patient with hypertension doing well on current medical regimen.  For now we will make no changes.  We will see him back for follow-up in a year or sooner as symptoms discussed.  Follow-up with primary as scheduled         Patient brought in medicine  list to appointment, it's been reviewed with patient and med list was updated in the chart.          Electronically signed by:

## 2022-02-02 NOTE — PATIENT INSTRUCTIONS

## 2022-09-21 ENCOUNTER — TELEPHONE (OUTPATIENT)
Dept: CARDIOLOGY | Facility: CLINIC | Age: 67
End: 2022-09-21

## 2022-09-21 NOTE — TELEPHONE ENCOUNTER
Called patient to verify what blood thinners he is taking, patient states he canceled this procedure, clearance not needed. Ailyn LINARES

## 2022-09-21 NOTE — TELEPHONE ENCOUNTER
Received cardiac clearance request from  stating pt has colonoscopy scheduled for 10/11/2022 and is requiring a cardiac clearance. Placed cardiac clearance request in Ailyn's inbox to review and address with provider.

## 2022-09-26 ENCOUNTER — TELEPHONE (OUTPATIENT)
Dept: CARDIOLOGY | Facility: CLINIC | Age: 67
End: 2022-09-26

## 2022-09-26 NOTE — TELEPHONE ENCOUNTER
Patient informed. Lin Amaro MA    ----- Message from ELENA Soler sent at 9/22/2022  9:51 AM EDT -----  Let patient know just aspirin to take, stop plavix  ----- Message -----  From: Ailyn Sosa LPN  Sent: 9/21/2022   5:55 PM EDT  To: ELENA Soler    Patient wants to know if he needs to continue Plavix? We dont have it listed in his medications in his chart.

## 2023-02-02 ENCOUNTER — OFFICE VISIT (OUTPATIENT)
Dept: CARDIOLOGY | Facility: CLINIC | Age: 68
End: 2023-02-02
Payer: MEDICARE

## 2023-02-02 VITALS
HEART RATE: 73 BPM | SYSTOLIC BLOOD PRESSURE: 118 MMHG | HEIGHT: 72 IN | OXYGEN SATURATION: 96 % | WEIGHT: 210.8 LBS | BODY MASS INDEX: 28.55 KG/M2 | DIASTOLIC BLOOD PRESSURE: 71 MMHG

## 2023-02-02 DIAGNOSIS — R06.00 DYSPNEA, UNSPECIFIED TYPE: ICD-10-CM

## 2023-02-02 DIAGNOSIS — I10 ESSENTIAL HYPERTENSION: ICD-10-CM

## 2023-02-02 DIAGNOSIS — I25.10 CORONARY ARTERY DISEASE INVOLVING NATIVE CORONARY ARTERY OF NATIVE HEART WITHOUT ANGINA PECTORIS: Primary | ICD-10-CM

## 2023-02-02 PROCEDURE — 99214 OFFICE O/P EST MOD 30 MIN: CPT | Performed by: PHYSICIAN ASSISTANT

## 2023-02-02 PROCEDURE — 93000 ELECTROCARDIOGRAM COMPLETE: CPT | Performed by: PHYSICIAN ASSISTANT

## 2023-02-02 NOTE — PROGRESS NOTES
Problem list     Subjective   Amrit Brannon is a 67 y.o. male     Chief Complaint   Patient presents with   • Shortness of Breath     Yearly f/u   • Coronary Artery Disease   • Palpitations       HPI    Problem list  1.  Coronary artery disease  1.1 cardiac catheterization approximately 5 to 10 years ago in [facility] with angioplasty x2 and stenting x1, inadequate data per patient report  2.  Hypertension  3.  Dyslipidemia  4.  Chronic tobacco use      Patient is a 67-year-old male who presents to the office to be evaluated.    Patient has done well with exception of worsening exertional dyspnea.  Patient has felt that he has been more short of breath during activity around his home that he had.    This has occurred recently.  Patient does not describe any chest pain or pressure.  He does not describe PND orthopnea.    He did not have classic symptoms previous to angioplasty and stenting in the past.  He does not describe palpitating or having dysrhythmic symptoms.  Otherwise he is doing well.    Current Outpatient Medications on File Prior to Visit   Medication Sig Dispense Refill   • aspirin 81 MG EC tablet Take 81 mg by mouth Daily.     • atorvastatin (LIPITOR) 40 MG tablet Take 1 tablet by mouth Daily.     • cetirizine (zyrTEC) 10 MG tablet Take 10 mg by mouth Daily.     • lisinopril (PRINIVIL,ZESTRIL) 20 MG tablet Take 1 tablet by mouth Daily.     • meloxicam (MOBIC) 7.5 MG tablet Take 7.5 mg by mouth Daily.     • traMADol (ULTRAM) 50 MG tablet Take 1 tablet by mouth 4 (Four) Times a Day As Needed.       No current facility-administered medications on file prior to visit.       Penicillins    Past Medical History:   Diagnosis Date   • Coronary artery disease    • Hyperlipidemia    • Hypertension    • Myocardial infarction (HCC)        Social History     Socioeconomic History   • Marital status:    Tobacco Use   • Smoking status: Every Day     Packs/day: 1.00     Years: 47.00     Pack years: 47.00  "    Types: Cigarettes   • Smokeless tobacco: Never   Substance and Sexual Activity   • Alcohol use: Yes     Comment: beer   • Drug use: Never   • Sexual activity: Defer       Family History   Problem Relation Age of Onset   • Diabetes Mother    • Heart disease Mother    • No Known Problems Father        Review of Systems   Constitutional: Negative.  Negative for chills, diaphoresis, fatigue and fever.   HENT: Negative.    Eyes: Negative.    Respiratory: Positive for shortness of breath (with minimal activity, walking to mailbox). Negative for apnea, cough, chest tightness and wheezing.    Cardiovascular: Negative for chest pain, palpitations (occas flutter) and leg swelling.   Gastrointestinal: Negative.  Negative for abdominal pain, blood in stool, constipation, diarrhea, nausea and vomiting.   Endocrine: Negative.    Genitourinary: Negative.  Negative for hematuria.   Musculoskeletal: Positive for arthralgias and back pain. Negative for myalgias and neck pain.   Skin: Negative.    Allergic/Immunologic: Negative.    Neurological: Negative.  Negative for dizziness, syncope, weakness, light-headedness, numbness and headaches.   Hematological: Negative.  Does not bruise/bleed easily.   Psychiatric/Behavioral: Negative.  Negative for sleep disturbance.       Objective   Vitals:    02/02/23 1031   BP: 118/71   BP Location: Left arm   Patient Position: Sitting   Pulse: 73   SpO2: 96%   Weight: 95.6 kg (210 lb 12.8 oz)   Height: 182.9 cm (72\")      /71 (BP Location: Left arm, Patient Position: Sitting)   Pulse 73   Ht 182.9 cm (72\")   Wt 95.6 kg (210 lb 12.8 oz)   SpO2 96%   BMI 28.59 kg/m²     Lab Results (most recent)     None          Physical Exam  Vitals and nursing note reviewed.   Constitutional:       General: He is not in acute distress.     Appearance: Normal appearance. He is well-developed.   HENT:      Head: Normocephalic and atraumatic.   Eyes:      General: No scleral icterus.        Right eye: " No discharge.         Left eye: No discharge.      Conjunctiva/sclera: Conjunctivae normal.   Neck:      Vascular: No carotid bruit.   Cardiovascular:      Rate and Rhythm: Normal rate and regular rhythm.      Heart sounds: Normal heart sounds. No murmur heard.    No friction rub. No gallop.   Pulmonary:      Effort: Pulmonary effort is normal. No respiratory distress.      Breath sounds: Normal breath sounds. No wheezing or rales.   Chest:      Chest wall: No tenderness.   Musculoskeletal:      Right lower leg: No edema.      Left lower leg: No edema.   Skin:     General: Skin is warm and dry.      Coloration: Skin is not pale.      Findings: No erythema or rash.   Neurological:      Mental Status: He is alert and oriented to person, place, and time.      Cranial Nerves: No cranial nerve deficit.   Psychiatric:         Behavior: Behavior normal.         Procedure     ECG 12 Lead    Date/Time: 2/2/2023 10:34 AM  Performed by: Axel Carvajal PA  Authorized by: Axel Carvajal PA   Comparison: compared with previous ECG from 8/2/2021  Comments: EKG demonstrates sinus rhythm at 76 bpm with no acute ST changes               Assessment & Plan     Problems Addressed this Visit        Cardiac and Vasculature    Coronary artery disease involving native coronary artery of native heart without angina pectoris - Primary    Relevant Orders    Adult Transthoracic Echo Complete W/ Cont if Necessary Per Protocol    Stress Test With Myocardial Perfusion One Day    Essential hypertension    Relevant Orders    Adult Transthoracic Echo Complete W/ Cont if Necessary Per Protocol    Stress Test With Myocardial Perfusion One Day       Symptoms and Signs    Dyspnea    Relevant Orders    Adult Transthoracic Echo Complete W/ Cont if Necessary Per Protocol    Stress Test With Myocardial Perfusion One Day    Ambulatory Referral to Pulmonology (Completed)   Diagnoses       Codes Comments    Coronary artery disease involving native  coronary artery of native heart without angina pectoris    -  Primary ICD-10-CM: I25.10  ICD-9-CM: 414.01     Dyspnea, unspecified type     ICD-10-CM: R06.00  ICD-9-CM: 786.09     Essential hypertension     ICD-10-CM: I10  ICD-9-CM: 401.9           Recommendation  1.  Patient is a 67-year-old male with history of coronary disease with complaints of worsening shortness of breath.  He has known coronary disease and has not had evaluation.  Therefore, we will schedule for testing.    2.  Stress test will be used for restratification exclude ischemia as a contributing factor patient's symptoms.    3.  Echo to assess and evaluate LV systolic diastolic performance, valvular structures pulmonary pressures etc.    4.  Otherwise blood pressure stable on current medical regimen.  I will make no changes.  We will see him back for follow-up on testing and recommend further.  Follow-up with primary as scheduled.           Amrit Brannon  reports that he has been smoking cigarettes. He has a 47.00 pack-year smoking history. He has never used smokeless tobacco.. I have educated him on the risk of diseases from using tobacco products such as cancer, COPD and heart disease.     I advised him to quit and he is not willing to quit.    I spent 3  minutes counseling the patient.           Electronically signed by:

## 2023-11-20 ENCOUNTER — OFFICE VISIT (OUTPATIENT)
Dept: CARDIOLOGY | Facility: CLINIC | Age: 68
End: 2023-11-20
Payer: MEDICARE

## 2023-11-20 VITALS
OXYGEN SATURATION: 99 % | BODY MASS INDEX: 28.17 KG/M2 | WEIGHT: 208 LBS | HEART RATE: 70 BPM | SYSTOLIC BLOOD PRESSURE: 121 MMHG | HEIGHT: 72 IN | DIASTOLIC BLOOD PRESSURE: 71 MMHG

## 2023-11-20 DIAGNOSIS — I10 ESSENTIAL HYPERTENSION: ICD-10-CM

## 2023-11-20 DIAGNOSIS — R06.00 DYSPNEA, UNSPECIFIED TYPE: ICD-10-CM

## 2023-11-20 DIAGNOSIS — I25.10 CORONARY ARTERY DISEASE INVOLVING NATIVE CORONARY ARTERY OF NATIVE HEART WITHOUT ANGINA PECTORIS: Primary | ICD-10-CM

## 2023-11-20 PROCEDURE — 99214 OFFICE O/P EST MOD 30 MIN: CPT | Performed by: PHYSICIAN ASSISTANT

## 2023-11-20 PROCEDURE — 3078F DIAST BP <80 MM HG: CPT | Performed by: PHYSICIAN ASSISTANT

## 2023-11-20 PROCEDURE — 3074F SYST BP LT 130 MM HG: CPT | Performed by: PHYSICIAN ASSISTANT

## 2023-11-20 NOTE — PROGRESS NOTES
Problem list     Subjective   Amrit Brannon is a 68 y.o. male     Chief Complaint   Patient presents with    Follow-up     7 months   Problem list  1.  Coronary artery disease  1.1 cardiac catheterization approximately 5 to 10 years ago in [facility] with angioplasty x2 and stenting x1, inadequate data per patient report  2.  Hypertension  3.  Dyslipidemia  4.  Chronic tobacco use  5.  Obstructive sleep apnea on CPAP therapy       HPI    Patient is a 68-year-old male who presents to the office for follow-up.  Patient recently was diagnosed with sleep apnea.  He was referred to pulmonology because of dyspnea and after treatment, even his dyspnea has improved per his report.    He does not describe any chest pain or pressure.  Dyspnea is mild.  He does not describe progressive dyspnea but rather improved dyspnea.  He does not describe PND or orthopnea.    No complaints of any palpitations or dysrhythmic symptoms.  He is stable otherwise.      Current Outpatient Medications on File Prior to Visit   Medication Sig Dispense Refill    aspirin 81 MG EC tablet Take 1 tablet by mouth Daily.      atorvastatin (LIPITOR) 40 MG tablet Take 1 tablet by mouth Daily.      cetirizine (zyrTEC) 10 MG tablet Take 1 tablet by mouth Daily.      lisinopril (PRINIVIL,ZESTRIL) 20 MG tablet Take 1 tablet by mouth Daily.      meloxicam (MOBIC) 7.5 MG tablet Take 1 tablet by mouth Daily.      traMADol (ULTRAM) 50 MG tablet Take 1 tablet by mouth 4 (Four) Times a Day As Needed.       No current facility-administered medications on file prior to visit.       Penicillins    Past Medical History:   Diagnosis Date    Coronary artery disease     Hyperlipidemia     Hypertension     Myocardial infarction        Social History     Socioeconomic History    Marital status:    Tobacco Use    Smoking status: Every Day     Packs/day: 1.00     Years: 47.00     Additional pack years: 0.00     Total pack years: 47.00     Types: Cigarettes     "Smokeless tobacco: Never   Substance and Sexual Activity    Alcohol use: Yes     Comment: beer    Drug use: Never    Sexual activity: Defer       Family History   Problem Relation Age of Onset    Diabetes Mother     Heart disease Mother     No Known Problems Father        Review of Systems   Constitutional: Negative.    HENT: Negative.     Eyes: Negative.    Respiratory:  Positive for apnea and shortness of breath. Negative for cough, chest tightness and wheezing.    Cardiovascular: Negative.  Negative for chest pain, palpitations and leg swelling.   Gastrointestinal: Negative.  Negative for blood in stool.   Endocrine: Negative.    Genitourinary: Negative.  Negative for hematuria.   Musculoskeletal: Negative.    Skin: Negative.  Negative for color change, rash and wound.   Allergic/Immunologic: Negative.    Neurological: Negative.  Negative for dizziness, syncope, weakness, light-headedness, numbness and headaches.   Hematological: Negative.  Does not bruise/bleed easily.   Psychiatric/Behavioral: Negative.  Negative for sleep disturbance.        Objective   Vitals:    11/20/23 1035   BP: 121/71   Pulse: 70   SpO2: 99%   Weight: 94.3 kg (208 lb)   Height: 182.9 cm (72\")      /71   Pulse 70   Ht 182.9 cm (72\")   Wt 94.3 kg (208 lb)   SpO2 99%   BMI 28.21 kg/m²     Lab Results (most recent)       None            Physical Exam  Vitals and nursing note reviewed.   Constitutional:       General: He is not in acute distress.     Appearance: Normal appearance. He is well-developed.   HENT:      Head: Normocephalic and atraumatic.   Eyes:      General: No scleral icterus.        Right eye: No discharge.         Left eye: No discharge.      Conjunctiva/sclera: Conjunctivae normal.   Neck:      Vascular: No carotid bruit.   Cardiovascular:      Rate and Rhythm: Normal rate and regular rhythm.      Heart sounds: Normal heart sounds. No murmur heard.     No friction rub. No gallop.   Pulmonary:      Effort: " Pulmonary effort is normal. No respiratory distress.      Breath sounds: Normal breath sounds. No wheezing or rales.   Chest:      Chest wall: No tenderness.   Musculoskeletal:      Right lower leg: No edema.      Left lower leg: No edema.   Skin:     General: Skin is warm and dry.      Coloration: Skin is not pale.      Findings: No erythema or rash.   Neurological:      Mental Status: He is alert and oriented to person, place, and time.      Cranial Nerves: No cranial nerve deficit.   Psychiatric:         Behavior: Behavior normal.         Procedure   Procedures       Assessment & Plan     Problems Addressed this Visit          Cardiac and Vasculature    Coronary artery disease involving native coronary artery of native heart without angina pectoris - Primary    Essential hypertension       Symptoms and Signs    Dyspnea     Diagnoses         Codes Comments    Coronary artery disease involving native coronary artery of native heart without angina pectoris    -  Primary ICD-10-CM: I25.10  ICD-9-CM: 414.01     Dyspnea, unspecified type     ICD-10-CM: R06.00  ICD-9-CM: 786.09     Essential hypertension     ICD-10-CM: I10  ICD-9-CM: 401.9             Recommendation  1.  Patient is a 68-year-old male who presents back for routine cardiac follow-up previous history of coronary disease with no angina.  He has had significant improvement of symptoms including dyspnea since treatment by pulmonology.  We discussed repeat cardiac testing but he feels he is stable.  In the past, we ordered testing because of his dyspnea.  He is not having symptoms like what he felt previous to stenting in the past.  We will monitor for now unless symptoms worsen as discussed.    2.  Patient's blood pressure currently stable we will monitor.  Patient's lipids are being managed by primary.  We do recommend smoking cessation and close lipid monitoring.  Apparently, he just had blood work done 2 to 3 weeks ago and this is being managed by  primary.    3.  We will continue the same and see him back for follow-up in 6 months or sooner as symptoms discussed.  Follow-up with primary as scheduled.           Amrit Brannon  reports that he has been smoking cigarettes. He has a 47.00 pack-year smoking history. He has never used smokeless tobacco.. I have educated him on the risk of diseases from using tobacco products such as cancer, COPD, and heart disease.     I advised him to quit and he is not willing to quit.    I spent 3  minutes counseling the patient.       Patient did not bring med list or medicine bottles to appointment, med list has been reviewed and updated based on patient's knowledge of their meds.      Advance Care Planning   ACP discussion was declined by the patient. Patient does not have an advance directive, declines further assistance.      Electronically signed by:

## 2023-11-20 NOTE — LETTER
November 20, 2023       No Recipients    Patient: Amrit Brannon   YOB: 1955   Date of Visit: 11/20/2023       Dear Eusebio Pederson, DO    Amrit Brannon was in my office today. Below is a copy of my note.    If you have questions, please do not hesitate to call me. I look forward to following Amrit along with you.         Sincerely,        ELENA Patton        CC:   No Recipients    Problem list     Subjective  Amrit Brannon is a 68 y.o. male     Chief Complaint   Patient presents with   • Follow-up     7 months   Problem list  1.  Coronary artery disease  1.1 cardiac catheterization approximately 5 to 10 years ago in [facility] with angioplasty x2 and stenting x1, inadequate data per patient report  2.  Hypertension  3.  Dyslipidemia  4.  Chronic tobacco use  5.  Obstructive sleep apnea on CPAP therapy       HPI    Patient is a 68-year-old male who presents to the office for follow-up.  Patient recently was diagnosed with sleep apnea.  He was referred to pulmonology because of dyspnea and after treatment, even his dyspnea has improved per his report.    He does not describe any chest pain or pressure.  Dyspnea is mild.  He does not describe progressive dyspnea but rather improved dyspnea.  He does not describe PND or orthopnea.    No complaints of any palpitations or dysrhythmic symptoms.  He is stable otherwise.      Current Outpatient Medications on File Prior to Visit   Medication Sig Dispense Refill   • aspirin 81 MG EC tablet Take 1 tablet by mouth Daily.     • atorvastatin (LIPITOR) 40 MG tablet Take 1 tablet by mouth Daily.     • cetirizine (zyrTEC) 10 MG tablet Take 1 tablet by mouth Daily.     • lisinopril (PRINIVIL,ZESTRIL) 20 MG tablet Take 1 tablet by mouth Daily.     • meloxicam (MOBIC) 7.5 MG tablet Take 1 tablet by mouth Daily.     • traMADol (ULTRAM) 50 MG tablet Take 1 tablet by mouth 4 (Four) Times a Day As Needed.       No current facility-administered medications on  "file prior to visit.       Penicillins    Past Medical History:   Diagnosis Date   • Coronary artery disease    • Hyperlipidemia    • Hypertension    • Myocardial infarction        Social History     Socioeconomic History   • Marital status:    Tobacco Use   • Smoking status: Every Day     Packs/day: 1.00     Years: 47.00     Additional pack years: 0.00     Total pack years: 47.00     Types: Cigarettes   • Smokeless tobacco: Never   Substance and Sexual Activity   • Alcohol use: Yes     Comment: beer   • Drug use: Never   • Sexual activity: Defer       Family History   Problem Relation Age of Onset   • Diabetes Mother    • Heart disease Mother    • No Known Problems Father        Review of Systems   Constitutional: Negative.    HENT: Negative.     Eyes: Negative.    Respiratory:  Positive for apnea and shortness of breath. Negative for cough, chest tightness and wheezing.    Cardiovascular: Negative.  Negative for chest pain, palpitations and leg swelling.   Gastrointestinal: Negative.  Negative for blood in stool.   Endocrine: Negative.    Genitourinary: Negative.  Negative for hematuria.   Musculoskeletal: Negative.    Skin: Negative.  Negative for color change, rash and wound.   Allergic/Immunologic: Negative.    Neurological: Negative.  Negative for dizziness, syncope, weakness, light-headedness, numbness and headaches.   Hematological: Negative.  Does not bruise/bleed easily.   Psychiatric/Behavioral: Negative.  Negative for sleep disturbance.        Objective  Vitals:    11/20/23 1035   BP: 121/71   Pulse: 70   SpO2: 99%   Weight: 94.3 kg (208 lb)   Height: 182.9 cm (72\")      /71   Pulse 70   Ht 182.9 cm (72\")   Wt 94.3 kg (208 lb)   SpO2 99%   BMI 28.21 kg/m²     Lab Results (most recent)       None            Physical Exam  Vitals and nursing note reviewed.   Constitutional:       General: He is not in acute distress.     Appearance: Normal appearance. He is well-developed.   HENT:      " Head: Normocephalic and atraumatic.   Eyes:      General: No scleral icterus.        Right eye: No discharge.         Left eye: No discharge.      Conjunctiva/sclera: Conjunctivae normal.   Neck:      Vascular: No carotid bruit.   Cardiovascular:      Rate and Rhythm: Normal rate and regular rhythm.      Heart sounds: Normal heart sounds. No murmur heard.     No friction rub. No gallop.   Pulmonary:      Effort: Pulmonary effort is normal. No respiratory distress.      Breath sounds: Normal breath sounds. No wheezing or rales.   Chest:      Chest wall: No tenderness.   Musculoskeletal:      Right lower leg: No edema.      Left lower leg: No edema.   Skin:     General: Skin is warm and dry.      Coloration: Skin is not pale.      Findings: No erythema or rash.   Neurological:      Mental Status: He is alert and oriented to person, place, and time.      Cranial Nerves: No cranial nerve deficit.   Psychiatric:         Behavior: Behavior normal.         Procedure  Procedures       Assessment & Plan    Problems Addressed this Visit          Cardiac and Vasculature    Coronary artery disease involving native coronary artery of native heart without angina pectoris - Primary    Essential hypertension       Symptoms and Signs    Dyspnea     Diagnoses         Codes Comments    Coronary artery disease involving native coronary artery of native heart without angina pectoris    -  Primary ICD-10-CM: I25.10  ICD-9-CM: 414.01     Dyspnea, unspecified type     ICD-10-CM: R06.00  ICD-9-CM: 786.09     Essential hypertension     ICD-10-CM: I10  ICD-9-CM: 401.9             Recommendation  1.  Patient is a 68-year-old male who presents back for routine cardiac follow-up previous history of coronary disease with no angina.  He has had significant improvement of symptoms including dyspnea since treatment by pulmonology.  We discussed repeat cardiac testing but he feels he is stable.  In the past, we ordered testing because of his dyspnea.   He is not having symptoms like what he felt previous to stenting in the past.  We will monitor for now unless symptoms worsen as discussed.    2.  Patient's blood pressure currently stable we will monitor.  Patient's lipids are being managed by primary.  We do recommend smoking cessation and close lipid monitoring.  Apparently, he just had blood work done 2 to 3 weeks ago and this is being managed by primary.    3.  We will continue the same and see him back for follow-up in 6 months or sooner as symptoms discussed.  Follow-up with primary as scheduled.           Amrit Brannon  reports that he has been smoking cigarettes. He has a 47.00 pack-year smoking history. He has never used smokeless tobacco.. I have educated him on the risk of diseases from using tobacco products such as cancer, COPD, and heart disease.     I advised him to quit and he is not willing to quit.    I spent 3  minutes counseling the patient.       Patient did not bring med list or medicine bottles to appointment, med list has been reviewed and updated based on patient's knowledge of their meds.      Advance Care Planning  ACP discussion was declined by the patient. Patient does not have an advance directive, declines further assistance.      Electronically signed by:

## 2024-05-29 ENCOUNTER — OFFICE VISIT (OUTPATIENT)
Dept: CARDIOLOGY | Facility: CLINIC | Age: 69
End: 2024-05-29
Payer: MEDICARE

## 2024-05-29 VITALS
WEIGHT: 205 LBS | HEIGHT: 72 IN | SYSTOLIC BLOOD PRESSURE: 116 MMHG | HEART RATE: 71 BPM | DIASTOLIC BLOOD PRESSURE: 69 MMHG | BODY MASS INDEX: 27.77 KG/M2

## 2024-05-29 DIAGNOSIS — R06.00 DYSPNEA, UNSPECIFIED TYPE: ICD-10-CM

## 2024-05-29 DIAGNOSIS — I25.10 CORONARY ARTERY DISEASE INVOLVING NATIVE CORONARY ARTERY OF NATIVE HEART WITHOUT ANGINA PECTORIS: Primary | ICD-10-CM

## 2024-05-29 DIAGNOSIS — I10 ESSENTIAL HYPERTENSION: ICD-10-CM

## 2024-05-29 DIAGNOSIS — E78.00 PURE HYPERCHOLESTEROLEMIA: ICD-10-CM

## 2024-05-29 PROCEDURE — 99213 OFFICE O/P EST LOW 20 MIN: CPT | Performed by: PHYSICIAN ASSISTANT

## 2024-05-29 PROCEDURE — 3078F DIAST BP <80 MM HG: CPT | Performed by: PHYSICIAN ASSISTANT

## 2024-05-29 PROCEDURE — 3074F SYST BP LT 130 MM HG: CPT | Performed by: PHYSICIAN ASSISTANT

## 2024-05-29 NOTE — PROGRESS NOTES
Problem list     Subjective   Amrit Brannon is a 69 y.o. male     Chief Complaint   Patient presents with    6 months     CAD   Problem list  1.  Coronary artery disease  1.1 cardiac catheterization approximately 5 to 10 years ago in [facility] with angioplasty x2 and stenting x1, inadequate data per patient report  2.  Hypertension  3.  Dyslipidemia  4.  Chronic tobacco use  5.  Obstructive sleep apnea on CPAP therapy    HPI    Patient is a 69-year-old male who presents back to the office for routine cardiac assessment.  Patient has done relatively well without any chest pain or pressure.    He has a degree of dyspnea that appears to be mild.  No complaints of PND or orthopnea.  Patient is on CPAP therapy.    Patient does not describe palpitating nor does he complain of dysrhythmic symptoms.  No complaints of edema.  Patient is stable otherwise.      Current Outpatient Medications on File Prior to Visit   Medication Sig Dispense Refill    aspirin 81 MG EC tablet Take 1 tablet by mouth Daily.      atorvastatin (LIPITOR) 40 MG tablet Take 1 tablet by mouth Daily.      cetirizine (zyrTEC) 10 MG tablet Take 1 tablet by mouth Daily.      lisinopril (PRINIVIL,ZESTRIL) 20 MG tablet Take 1 tablet by mouth Daily.      meloxicam (MOBIC) 7.5 MG tablet Take 1 tablet by mouth Daily.      traMADol (ULTRAM) 50 MG tablet Take 1 tablet by mouth 4 (Four) Times a Day As Needed.       No current facility-administered medications on file prior to visit.       Penicillins    Past Medical History:   Diagnosis Date    Coronary artery disease     Hyperlipidemia     Hypertension     Myocardial infarction        Social History     Socioeconomic History    Marital status:    Tobacco Use    Smoking status: Former     Current packs/day: 1.00     Average packs/day: 1 pack/day for 47.0 years (47.0 ttl pk-yrs)     Types: Cigarettes    Smokeless tobacco: Never   Substance and Sexual Activity    Alcohol use: Yes     Comment: beer     "Drug use: Never    Sexual activity: Defer       Family History   Problem Relation Age of Onset    Diabetes Mother     Heart disease Mother     No Known Problems Father        Review of Systems   Constitutional:  Negative for activity change, appetite change, chills, fatigue and fever.   HENT: Negative.  Negative for congestion, sinus pressure and sinus pain.    Eyes: Negative.  Negative for visual disturbance.   Respiratory:  Positive for shortness of breath. Negative for apnea, cough, chest tightness and wheezing.    Cardiovascular: Negative.  Negative for chest pain, palpitations and leg swelling.   Gastrointestinal: Negative.  Negative for blood in stool.   Endocrine: Negative.  Negative for cold intolerance and heat intolerance.   Genitourinary: Negative.  Negative for hematuria.   Musculoskeletal: Negative.  Negative for gait problem.   Skin: Negative.  Negative for color change, rash and wound.   Allergic/Immunologic: Negative.  Negative for environmental allergies and food allergies.   Neurological: Negative.  Negative for dizziness, syncope, weakness, light-headedness, numbness and headaches.   Hematological: Negative.  Does not bruise/bleed easily.   Psychiatric/Behavioral: Negative.  Negative for sleep disturbance.        Objective   Vitals:    05/29/24 1017   BP: 116/69   BP Location: Right arm   Patient Position: Sitting   Cuff Size: Adult   Pulse: 71   Weight: 93 kg (205 lb)   Height: 182.9 cm (72\")      /69 (BP Location: Right arm, Patient Position: Sitting, Cuff Size: Adult)   Pulse 71   Ht 182.9 cm (72\")   Wt 93 kg (205 lb)   BMI 27.80 kg/m²     Lab Results (most recent)       None            Physical Exam  Vitals and nursing note reviewed.   Constitutional:       General: He is not in acute distress.     Appearance: Normal appearance. He is well-developed.   HENT:      Head: Normocephalic and atraumatic.   Eyes:      General: No scleral icterus.        Right eye: No discharge.         Left " eye: No discharge.      Conjunctiva/sclera: Conjunctivae normal.   Neck:      Vascular: No carotid bruit.   Cardiovascular:      Rate and Rhythm: Normal rate and regular rhythm.      Heart sounds: Normal heart sounds. No murmur heard.     No friction rub. No gallop.   Pulmonary:      Effort: Pulmonary effort is normal. No respiratory distress.      Breath sounds: Normal breath sounds. No wheezing or rales.   Chest:      Chest wall: No tenderness.   Musculoskeletal:      Right lower leg: No edema.      Left lower leg: No edema.   Skin:     General: Skin is warm and dry.      Coloration: Skin is not pale.      Findings: No erythema or rash.   Neurological:      Mental Status: He is alert and oriented to person, place, and time.      Cranial Nerves: No cranial nerve deficit.   Psychiatric:         Behavior: Behavior normal.         Procedure   Procedures       Assessment & Plan     Problems Addressed this Visit          Cardiac and Vasculature    Coronary artery disease involving native coronary artery of native heart without angina pectoris - Primary    Essential hypertension    Pure hypercholesterolemia       Symptoms and Signs    Dyspnea     Diagnoses         Codes Comments    Coronary artery disease involving native coronary artery of native heart without angina pectoris    -  Primary ICD-10-CM: I25.10  ICD-9-CM: 414.01     Essential hypertension     ICD-10-CM: I10  ICD-9-CM: 401.9     Dyspnea, unspecified type     ICD-10-CM: R06.00  ICD-9-CM: 786.09     Pure hypercholesterolemia     ICD-10-CM: E78.00  ICD-9-CM: 272.0             Recommendations  1.  Patient is a 69-year-old male who presents to the office for evaluation with known coronary disease doing well without any angina.  He has no chest pain or discomfort at all.  Patient has very mild dyspnea at this time.  No progressive dyspnea.  We can monitor.  Will continue antiplatelet and statin therapy at this time.    2.  Patient now on 40 mg of Lipitor.  Patient  has labs performed by primary.    3.  Patient with baseline hypertension doing well on medical therapy.  For now, patient is stable we will see him back for follow-up in 6 months or sooner as symptoms discussed.  Follow-up with primary as scheduled.         Patient did not bring med list or medicine bottles to appointment, med list has been reviewed and updated based on patient's knowledge of their meds.        Advance Care Planning   ACP discussion was declined by the patient. Patient does not have an advance directive, declines further assistance.      Electronically signed by:

## 2024-05-29 NOTE — LETTER
May 29, 2024       No Recipients    Patient: Amrit Brannon   YOB: 1955   Date of Visit: 5/29/2024       Dear Eusebio Pederson, DO    Amrit Brannon was in my office today. Below is a copy of my note.    If you have questions, please do not hesitate to call me. I look forward to following Amrit along with you.         Sincerely,        ELENA Patton        CC:   No Recipients    Problem list     Subjective  Amrit Brannon is a 69 y.o. male     Chief Complaint   Patient presents with   • 6 months     CAD   Problem list  1.  Coronary artery disease  1.1 cardiac catheterization approximately 5 to 10 years ago in [facility] with angioplasty x2 and stenting x1, inadequate data per patient report  2.  Hypertension  3.  Dyslipidemia  4.  Chronic tobacco use  5.  Obstructive sleep apnea on CPAP therapy    HPI    Patient is a 69-year-old male who presents back to the office for routine cardiac assessment.  Patient has done relatively well without any chest pain or pressure.    He has a degree of dyspnea that appears to be mild.  No complaints of PND or orthopnea.  Patient is on CPAP therapy.    Patient does not describe palpitating nor does he complain of dysrhythmic symptoms.  No complaints of edema.  Patient is stable otherwise.      Current Outpatient Medications on File Prior to Visit   Medication Sig Dispense Refill   • aspirin 81 MG EC tablet Take 1 tablet by mouth Daily.     • atorvastatin (LIPITOR) 40 MG tablet Take 1 tablet by mouth Daily.     • cetirizine (zyrTEC) 10 MG tablet Take 1 tablet by mouth Daily.     • lisinopril (PRINIVIL,ZESTRIL) 20 MG tablet Take 1 tablet by mouth Daily.     • meloxicam (MOBIC) 7.5 MG tablet Take 1 tablet by mouth Daily.     • traMADol (ULTRAM) 50 MG tablet Take 1 tablet by mouth 4 (Four) Times a Day As Needed.       No current facility-administered medications on file prior to visit.       Penicillins    Past Medical History:   Diagnosis Date   • Coronary  "artery disease    • Hyperlipidemia    • Hypertension    • Myocardial infarction        Social History     Socioeconomic History   • Marital status:    Tobacco Use   • Smoking status: Former     Current packs/day: 1.00     Average packs/day: 1 pack/day for 47.0 years (47.0 ttl pk-yrs)     Types: Cigarettes   • Smokeless tobacco: Never   Substance and Sexual Activity   • Alcohol use: Yes     Comment: beer   • Drug use: Never   • Sexual activity: Defer       Family History   Problem Relation Age of Onset   • Diabetes Mother    • Heart disease Mother    • No Known Problems Father        Review of Systems   Constitutional:  Negative for activity change, appetite change, chills, fatigue and fever.   HENT: Negative.  Negative for congestion, sinus pressure and sinus pain.    Eyes: Negative.  Negative for visual disturbance.   Respiratory:  Positive for shortness of breath. Negative for apnea, cough, chest tightness and wheezing.    Cardiovascular: Negative.  Negative for chest pain, palpitations and leg swelling.   Gastrointestinal: Negative.  Negative for blood in stool.   Endocrine: Negative.  Negative for cold intolerance and heat intolerance.   Genitourinary: Negative.  Negative for hematuria.   Musculoskeletal: Negative.  Negative for gait problem.   Skin: Negative.  Negative for color change, rash and wound.   Allergic/Immunologic: Negative.  Negative for environmental allergies and food allergies.   Neurological: Negative.  Negative for dizziness, syncope, weakness, light-headedness, numbness and headaches.   Hematological: Negative.  Does not bruise/bleed easily.   Psychiatric/Behavioral: Negative.  Negative for sleep disturbance.        Objective  Vitals:    05/29/24 1017   BP: 116/69   BP Location: Right arm   Patient Position: Sitting   Cuff Size: Adult   Pulse: 71   Weight: 93 kg (205 lb)   Height: 182.9 cm (72\")      /69 (BP Location: Right arm, Patient Position: Sitting, Cuff Size: Adult)   " "Pulse 71   Ht 182.9 cm (72\")   Wt 93 kg (205 lb)   BMI 27.80 kg/m²     Lab Results (most recent)       None            Physical Exam  Vitals and nursing note reviewed.   Constitutional:       General: He is not in acute distress.     Appearance: Normal appearance. He is well-developed.   HENT:      Head: Normocephalic and atraumatic.   Eyes:      General: No scleral icterus.        Right eye: No discharge.         Left eye: No discharge.      Conjunctiva/sclera: Conjunctivae normal.   Neck:      Vascular: No carotid bruit.   Cardiovascular:      Rate and Rhythm: Normal rate and regular rhythm.      Heart sounds: Normal heart sounds. No murmur heard.     No friction rub. No gallop.   Pulmonary:      Effort: Pulmonary effort is normal. No respiratory distress.      Breath sounds: Normal breath sounds. No wheezing or rales.   Chest:      Chest wall: No tenderness.   Musculoskeletal:      Right lower leg: No edema.      Left lower leg: No edema.   Skin:     General: Skin is warm and dry.      Coloration: Skin is not pale.      Findings: No erythema or rash.   Neurological:      Mental Status: He is alert and oriented to person, place, and time.      Cranial Nerves: No cranial nerve deficit.   Psychiatric:         Behavior: Behavior normal.         Procedure  Procedures       Assessment & Plan    Problems Addressed this Visit          Cardiac and Vasculature    Coronary artery disease involving native coronary artery of native heart without angina pectoris - Primary    Essential hypertension    Pure hypercholesterolemia       Symptoms and Signs    Dyspnea     Diagnoses         Codes Comments    Coronary artery disease involving native coronary artery of native heart without angina pectoris    -  Primary ICD-10-CM: I25.10  ICD-9-CM: 414.01     Essential hypertension     ICD-10-CM: I10  ICD-9-CM: 401.9     Dyspnea, unspecified type     ICD-10-CM: R06.00  ICD-9-CM: 786.09     Pure hypercholesterolemia     ICD-10-CM: " E78.00  ICD-9-CM: 272.0             Recommendations  1.  Patient is a 69-year-old male who presents to the office for evaluation with known coronary disease doing well without any angina.  He has no chest pain or discomfort at all.  Patient has very mild dyspnea at this time.  No progressive dyspnea.  We can monitor.  Will continue antiplatelet and statin therapy at this time.    2.  Patient now on 40 mg of Lipitor.  Patient has labs performed by primary.    3.  Patient with baseline hypertension doing well on medical therapy.  For now, patient is stable we will see him back for follow-up in 6 months or sooner as symptoms discussed.  Follow-up with primary as scheduled.         Patient did not bring med list or medicine bottles to appointment, med list has been reviewed and updated based on patient's knowledge of their meds.        Advance Care Planning  ACP discussion was declined by the patient. Patient does not have an advance directive, declines further assistance.      Electronically signed by:

## 2025-08-20 ENCOUNTER — OFFICE VISIT (OUTPATIENT)
Dept: CARDIOLOGY | Facility: CLINIC | Age: 70
End: 2025-08-20
Payer: MEDICARE

## 2025-08-20 VITALS
OXYGEN SATURATION: 97 % | HEIGHT: 71 IN | HEART RATE: 76 BPM | DIASTOLIC BLOOD PRESSURE: 69 MMHG | BODY MASS INDEX: 30.94 KG/M2 | WEIGHT: 221 LBS | SYSTOLIC BLOOD PRESSURE: 131 MMHG

## 2025-08-20 DIAGNOSIS — E78.00 PURE HYPERCHOLESTEROLEMIA: ICD-10-CM

## 2025-08-20 DIAGNOSIS — R06.00 DYSPNEA, UNSPECIFIED TYPE: ICD-10-CM

## 2025-08-20 DIAGNOSIS — I10 ESSENTIAL HYPERTENSION: ICD-10-CM

## 2025-08-20 DIAGNOSIS — I25.10 CORONARY ARTERY DISEASE INVOLVING NATIVE CORONARY ARTERY OF NATIVE HEART WITHOUT ANGINA PECTORIS: Primary | ICD-10-CM

## 2025-08-20 PROCEDURE — 99214 OFFICE O/P EST MOD 30 MIN: CPT | Performed by: PHYSICIAN ASSISTANT

## 2025-08-20 PROCEDURE — 3078F DIAST BP <80 MM HG: CPT | Performed by: PHYSICIAN ASSISTANT

## 2025-08-20 PROCEDURE — 93000 ELECTROCARDIOGRAM COMPLETE: CPT | Performed by: PHYSICIAN ASSISTANT

## 2025-08-20 PROCEDURE — 3075F SYST BP GE 130 - 139MM HG: CPT | Performed by: PHYSICIAN ASSISTANT
